# Patient Record
Sex: MALE | Race: WHITE
[De-identification: names, ages, dates, MRNs, and addresses within clinical notes are randomized per-mention and may not be internally consistent; named-entity substitution may affect disease eponyms.]

---

## 2019-12-16 ENCOUNTER — HEALTH MAINTENANCE LETTER (OUTPATIENT)
Age: 59
End: 2019-12-16

## 2021-01-15 ENCOUNTER — HEALTH MAINTENANCE LETTER (OUTPATIENT)
Age: 61
End: 2021-01-15

## 2021-09-04 ENCOUNTER — HEALTH MAINTENANCE LETTER (OUTPATIENT)
Age: 61
End: 2021-09-04

## 2022-02-19 ENCOUNTER — HEALTH MAINTENANCE LETTER (OUTPATIENT)
Age: 62
End: 2022-02-19

## 2022-10-22 ENCOUNTER — HEALTH MAINTENANCE LETTER (OUTPATIENT)
Age: 62
End: 2022-10-22

## 2023-04-01 ENCOUNTER — HEALTH MAINTENANCE LETTER (OUTPATIENT)
Age: 63
End: 2023-04-01

## 2025-04-02 ENCOUNTER — OFFICE VISIT (OUTPATIENT)
Dept: FAMILY MEDICINE | Facility: CLINIC | Age: 65
End: 2025-04-02

## 2025-04-02 VITALS
WEIGHT: 148 LBS | OXYGEN SATURATION: 99 % | DIASTOLIC BLOOD PRESSURE: 76 MMHG | SYSTOLIC BLOOD PRESSURE: 128 MMHG | TEMPERATURE: 97.3 F | HEIGHT: 69 IN | HEART RATE: 50 BPM | BODY MASS INDEX: 21.92 KG/M2

## 2025-04-02 DIAGNOSIS — Z00.00 ENCOUNTER FOR MEDICARE ANNUAL WELLNESS EXAM: ICD-10-CM

## 2025-04-02 DIAGNOSIS — Z12.5 PROSTATE CANCER SCREENING: ICD-10-CM

## 2025-04-02 DIAGNOSIS — Z13.220 LIPID SCREENING: ICD-10-CM

## 2025-04-02 DIAGNOSIS — Z87.898 HISTORY OF PREDIABETES: ICD-10-CM

## 2025-04-02 DIAGNOSIS — K21.9 GASTROESOPHAGEAL REFLUX DISEASE, UNSPECIFIED WHETHER ESOPHAGITIS PRESENT: ICD-10-CM

## 2025-04-02 DIAGNOSIS — Z78.9 VEGAN DIET: ICD-10-CM

## 2025-04-02 DIAGNOSIS — Z00.00 ROUTINE PHYSICAL EXAMINATION: Primary | ICD-10-CM

## 2025-04-02 DIAGNOSIS — Z23 NEED FOR VACCINATION: ICD-10-CM

## 2025-04-02 DIAGNOSIS — N52.9 ERECTILE DYSFUNCTION, UNSPECIFIED ERECTILE DYSFUNCTION TYPE: ICD-10-CM

## 2025-04-02 LAB
BUN SERPL-MCNC: 13 MG/DL (ref 7–25)
BUN/CREATININE RATIO: 14 (ref 6–32)
CALCIUM SERPL-MCNC: 9.3 MG/DL (ref 8.6–10.3)
CHLORIDE SERPLBLD-SCNC: 107.2 MMOL/L (ref 98–110)
CHOLEST SERPL-MCNC: 179 MG/DL (ref 0–199)
CHOLEST/HDLC SERPL: 3 {RATIO} (ref 0–5)
CO2 SERPL-SCNC: 28.2 MMOL/L (ref 20–32)
CREAT SERPL-MCNC: 0.91 MG/DL (ref 0.6–1.3)
ERYTHROCYTE [DISTWIDTH] IN BLOOD BY AUTOMATED COUNT: 11.7 %
GLUCOSE SERPL-MCNC: 98 MG/DL (ref 60–99)
HCT VFR BLD AUTO: 44.7 % (ref 40–53)
HDLC SERPL-MCNC: 55 MG/DL (ref 40–150)
HEMOGLOBIN A1C: 5.5 % (ref 4–5.6)
HEMOGLOBIN: 14.7 G/DL (ref 13.3–17.7)
LDLC SERPL CALC-MCNC: 103 MG/DL (ref 0–129)
MCH RBC QN AUTO: 31.2 PG (ref 26–33)
MCHC RBC AUTO-ENTMCNC: 32.9 G/DL (ref 31–36)
MCV RBC AUTO: 94.9 FL (ref 78–100)
PLATELET COUNT - QUEST: 155 10^9/L (ref 150–375)
POTASSIUM SERPL-SCNC: 4.21 MMOL/L (ref 3.5–5.3)
RBC # BLD AUTO: 4.71 10*12/L (ref 4.4–5.9)
SODIUM SERPL-SCNC: 137.3 MMOL/L (ref 135–146)
TRIGL SERPL-MCNC: 106 MG/DL (ref 0–149)
WBC # BLD AUTO: 3.5 10*9/L (ref 4–11)

## 2025-04-02 RX ORDER — SILDENAFIL 100 MG/1
1 TABLET, FILM COATED ORAL DAILY PRN
COMMUNITY
Start: 2024-03-15 | End: 2025-04-02

## 2025-04-02 RX ORDER — SILDENAFIL 100 MG/1
100 TABLET, FILM COATED ORAL DAILY PRN
Qty: 30 TABLET | Refills: 5 | Status: SHIPPED | OUTPATIENT
Start: 2025-04-02

## 2025-04-02 NOTE — PROGRESS NOTES
SUBJECTIVE:   CC: Mikhail Sherman is an 65 year old male who presents for preventive health visit.     Patient has been advised of split billing requirements and indicates understanding: Yes    Nursing Notes:   Telly Cervantes CMA  4/2/2025 11:19 AM  Signed  Chief Complaint   Patient presents with    Physical     Annual, fasting    New Patient     [facility], [facility] insurance    Medicare Visit     Annual     Pre-visit Screening:  Immunizations:  up to date  Colonoscopy:  is up to date - 3 years ago, normal, [facility]  Mammogram: NA  Asthma Action Test/Plan:  NA  PHQ9:  NA  GAD7:  NA  Questioned patient about current smoking habits Pt. has never smoked.  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 yes  Do you see a dentist twice per year? yes  Do you have sleep apnea, excessive snoring or daytime drowsiness?occas snoring      Today's PHQ-2 Score:       4/2/2025    11:09 AM   PHQ-2 ( 1999 [employer])   Q1: Little interest or pleasure in doing things 0   Q2: Feeling down, depressed or hopeless 0   PHQ-2 Score 0       Do you feel safe in your environment? yes    Have you ever done Advance Care Planning? (For example, a Health Directive, POLST, or a discussion with a medical provider or your loved ones about your wishes): No, advance care planning information given to patient to review.  Patient plans to discuss their wishes with loved ones or provider.      Social History     Tobacco Use    Smoking status: Never    Smokeless tobacco: Never   Substance Use Topics     "Alcohol use: Yes     Alcohol/week: 2.0 standard drinks of alcohol     Types: 2 Standard drinks or equivalent per week     If you drink alcohol do you typically have >3 drinks per day or >7 drinks per week? No                      Last PSA: No results found for: \"PSA\"    Reviewed orders with patient. Reviewed health maintenance and updated orders accordingly - Yes  Lab work is in process  Labs reviewed in EPIC  BP Readings from Last 3 Encounters:   04/02/25 128/76   05/13/13 114/68   09/16/06 110/80    Wt Readings from Last 3 Encounters:   04/02/25 67.1 kg (148 lb)   05/13/13 73.5 kg (162 lb 1.6 oz)                    Reviewed and updated as needed this visit by clinical staff   Tobacco  Allergies  Meds              Reviewed and updated as needed this visit by Provider                  Past Medical History:   Diagnosis Date    Gastroesophageal reflux disease without esophagitis       Past Surgical History:   Procedure Laterality Date    APPENDECTOMY      SINUS SURGERY       Family History   Problem Relation Age of Onset    Hypertension Mother     Diverticulitis Mother     Lumbar disc disease Father         multiple surgeries    Prostate Cancer Father     Asthma Sister     Depression Maternal Grandfather     Thyroid Disease No family hx of     Colon Cancer No family hx of     Diabetes No family hx of     Cerebrovascular Disease No family hx of        ROS:  12 point ROS performed and negative for new concerns except as mentioned above     OBJECTIVE:   /76 (BP Location: Right arm, Patient Position: Sitting, Cuff Size: Adult Large)   Pulse 50   Temp 97.3  F (36.3  C) (Temporal)   Ht 1.753 m (5' 9\")   Wt 67.1 kg (148 lb)   SpO2 99%   BMI 21.86 kg/m    EXAM:  GENERAL: alert and no distress  EYES: Eyes grossly normal to inspection, PERRL and conjunctivae and sclerae normal  HENT: ear canals and TM's normal, nose and mouth without ulcers or lesions  NECK: no adenopathy, no asymmetry, masses, or scars  RESP: " "lungs clear to auscultation - no rales, rhonchi or wheezes  CV: regular rate and rhythm, normal S1 S2, no S3 or S4, no murmur, click or rub, no peripheral edema  ABDOMEN: soft, nontender, no hepatosplenomegaly, no masses and bowel sounds normal  MS: no gross musculoskeletal defects noted, no edema  SKIN: no suspicious lesions or rashes  NEURO: Normal strength and tone, mentation intact and speech normal  PSYCH: mentation appears normal, affect normal/bright    Diagnostic Test Results:  Labs reviewed in Epic    ASSESSMENT/PLAN:       ICD-10-CM    1. Routine physical examination  Z00.00       2. Encounter for Medicare annual wellness exam  Z00.00       3. Erectile dysfunction, unspecified erectile dysfunction type  N52.9 sildenafil (VIAGRA) 100 MG tablet      4. History of prediabetes  Z87.898 HEMOGLOBIN A1C ([facility])     Basic Metabolic Panel ([facility])      5. Lipid screening  Z13.220 Lipid Panel ([facility])      6. Prostate cancer screening  Z12.5 PSA Total ([facility])      7. Vegan diet  Z78.9 Hemogram Platelet ([facility])     VITAMIN B12 ([facility])      8. Gastroesophageal reflux disease, unspecified whether esophagitis present  K21.9       9. Need for vaccination  Z23 PNEUMOCOCCAL 20 VALENT CONJUGATE (PREVNAR 20)           Patient has been advised of split billing requirements and indicates understanding: Yes  COUNSELING:  Reviewed preventive health counseling, as reflected in patient instructions       Regular exercise       Healthy diet/nutrition       Vision screening       Immunizations       Alcohol Use        Colorectal cancer screening       Prostate cancer screening    Estimated body mass index is 21.86 kg/m  as calculated from the following:    Height as of this encounter: 1.753 m (5' 9\").    Weight as of this encounter: 67.1 kg (148 lb).      He reports that he has never smoked. He has never used smokeless tobacco.    Patient Instructions   Blood work today    Stay active    Follow-up annually, sooner if we can be helpful "     Chon Bui MD, [facility]

## 2025-04-02 NOTE — PROGRESS NOTES
Mikhail Sherman is a 65 year old male who presents for Medicare Annual Wellness Visit.    Current providers caring for this patient include:  Patient Care Team:  Chon Bui MD as PCP - General (Family Medicine)    Complete Medical and Social history reviewed with patient, outlined below.    There is no problem list on file for this patient.      Past Medical History:   Diagnosis Date    Gastroesophageal reflux disease without esophagitis        Past Surgical History:   Procedure Laterality Date    APPENDECTOMY      SINUS SURGERY         Family History   Problem Relation Age of Onset    Hypertension Mother     Diverticulitis Mother     Lumbar disc disease Father         multiple surgeries    Prostate Cancer Father     Asthma Sister     Depression Maternal Grandfather     Thyroid Disease No family hx of     Colon Cancer No family hx of     Diabetes No family hx of     Cerebrovascular Disease No family hx of        Social History     Tobacco Use    Smoking status: Never    Smokeless tobacco: Never   Substance Use Topics    Alcohol use: Yes     Alcohol/week: 2.0 standard drinks of alcohol     Types: 2 Standard drinks or equivalent per week       Diet: regular, low salt/low fat  Physical Activity: patient exercises 5 times weekly, depending on weather, hiking, walking, biking  Depression Screen:    Over the past 2 weeks, patient has felt down, depressed, or hopeless:  No    Over the past 2 weeks, patient has felt little interest or pleasure in doing things: No  Functional ability/Safety screen:  Up and go test (able to get up and walk longer than 30 seconds): Passed  Patient needs assistance with: nothing  Patient's home has the following possible safety concerns: none identified  Patient has concerns about his hearing:  Yes, high frequencies  Cognitive Screen  Patient repeats three objects (doreen, apple, table)      Clock drawing test: NORMAL  Recalls three objects after 3 minutes (doreen, apple, table):        "                                                                                        recalls 3 objects (3 points)    Physical Exam:  /76 (BP Location: Right arm, Patient Position: Sitting, Cuff Size: Adult Large)   Pulse 50   Temp 97.3  F (36.3  C) (Temporal)   Ht 1.753 m (5' 9\")   Wt 67.1 kg (148 lb)   SpO2 99%   BMI 21.86 kg/m     Body mass index is 21.86 kg/m .       End of Life Planning:   Patient currently has an advanced directive: No.  I have verified the patient's ablity to prepare an advanced directive/make health care decisions.  Literature was provided to assist patient in preparing an advanced directive.    Education/Counseling:   Based on review of the above information, the following items were addressed:    Appropriate preventive services were discussed with this patient, including applicable screening as appropriate for cardiovascular disease, diabetes, osteopenia/osteoporosis, and glaucoma.  As appropriate for age/gender, discussed screening for colorectal cancer, prostate cancer, breast cancer, and cervical cancer.   Checklist reviewing preventive services available has been given to the patient.      Chon Bui MD, [facility]   "

## 2025-04-02 NOTE — NURSING NOTE
Chief Complaint   Patient presents with    Physical     Annual, fasting    New Patient     [facility] clinic, new [employer] insurance Medicare Visit     Annual     Pre-visit Screening:  Immunizations:  up to date  Colonoscopy:  is up to date - 3 years ago, normal, [facility]  Mammogram: NA  Asthma Action Test/Plan:  NA  PHQ9:  NA  GAD7:  NA  Questioned patient about current smoking habits Pt. has never smoked.  Ok to leave detailed message on voice mail for today's visit only yes, phone # [phone] (home)

## 2025-04-03 LAB
ABBOTT PSA - QUEST: 0.36 NG/ML
VIT B12 SERPL-MCNC: 306 PG/ML (ref 200–1100)

## 2025-07-14 ENCOUNTER — MYC MEDICAL ADVICE (OUTPATIENT)
Dept: FAMILY MEDICINE | Facility: CLINIC | Age: 65
End: 2025-07-14

## 2025-07-28 ENCOUNTER — MYC MEDICAL ADVICE (OUTPATIENT)
Dept: FAMILY MEDICINE | Facility: CLINIC | Age: 65
End: 2025-07-28

## 2025-07-28 DIAGNOSIS — G47.9 SLEEP DIFFICULTIES: ICD-10-CM

## 2025-07-28 NOTE — TELEPHONE ENCOUNTER
Pending Prescriptions:                       Disp   Refills    traZODone (DESYREL) 50 MG tablet [Pharmac*30 tab*0            Sig: TAKE 1/2 TO 2 TABLETS(25  MG) BY MOUTH           EVERY NIGHT AS NEEDED FOR SLEEP    Sent a my chart message to pt to see how he is doing on this medication.  No notes on 7- when to rtc.   Only 30 days given at that time  Erin

## 2025-07-29 RX ORDER — TRAZODONE HYDROCHLORIDE 50 MG/1
TABLET ORAL
COMMUNITY
Start: 2025-07-29

## 2025-07-29 NOTE — TELEPHONE ENCOUNTER
Mikhail Sherman is requesting a refill of:    Refused Prescriptions:                       Disp   Refills    traZODone (DESYREL) 50 MG tablet [Pharmacy*                Sig: TAKE 1/2 TO 2 TABLETS(25  MG) BY MOUTH EVERY           NIGHT AS NEEDED FOR SLEEP  Refused By: RUBEN LEA  Reason for Refusal: Patient should contact provider first    Sent pt [employer] message